# Patient Record
Sex: MALE | Race: WHITE | NOT HISPANIC OR LATINO | URBAN - METROPOLITAN AREA
[De-identification: names, ages, dates, MRNs, and addresses within clinical notes are randomized per-mention and may not be internally consistent; named-entity substitution may affect disease eponyms.]

---

## 2017-01-01 ENCOUNTER — COMMUNICATION - HEALTHEAST (OUTPATIENT)
Dept: ONCOLOGY | Facility: HOSPITAL | Age: 77
End: 2017-01-01

## 2017-01-01 ENCOUNTER — HOME CARE/HOSPICE - HEALTHEAST (OUTPATIENT)
Dept: HOME HEALTH SERVICES | Facility: HOME HEALTH | Age: 77
End: 2017-01-01

## 2017-01-01 ENCOUNTER — COMMUNICATION - HEALTHEAST (OUTPATIENT)
Dept: SCHEDULING | Facility: CLINIC | Age: 77
End: 2017-01-01

## 2017-01-01 ENCOUNTER — HOME CARE/HOSPICE - HEALTHEAST (OUTPATIENT)
Dept: HOSPICE | Facility: HOSPICE | Age: 77
End: 2017-01-01

## 2017-01-01 ENCOUNTER — AMBULATORY - HEALTHEAST (OUTPATIENT)
Dept: INFUSION THERAPY | Facility: HOSPITAL | Age: 77
End: 2017-01-01

## 2017-01-01 ENCOUNTER — COMMUNICATION - HEALTHEAST (OUTPATIENT)
Dept: OCCUPATIONAL THERAPY | Facility: CLINIC | Age: 77
End: 2017-01-01

## 2017-01-01 ENCOUNTER — AMBULATORY - HEALTHEAST (OUTPATIENT)
Dept: ONCOLOGY | Facility: HOSPITAL | Age: 77
End: 2017-01-01

## 2017-01-01 ENCOUNTER — COMMUNICATION - HEALTHEAST (OUTPATIENT)
Dept: FAMILY MEDICINE | Facility: CLINIC | Age: 77
End: 2017-01-01

## 2017-01-01 ENCOUNTER — RECORDS - HEALTHEAST (OUTPATIENT)
Dept: ADMINISTRATIVE | Facility: OTHER | Age: 77
End: 2017-01-01

## 2017-01-01 ENCOUNTER — HOSPITAL ENCOUNTER (OUTPATIENT)
Dept: ULTRASOUND IMAGING | Facility: HOSPITAL | Age: 77
Discharge: HOME | End: 2017-06-13

## 2017-01-01 ENCOUNTER — COMMUNICATION - HEALTHEAST (OUTPATIENT)
Dept: ONCOLOGY | Facility: CLINIC | Age: 77
End: 2017-01-01

## 2017-01-01 ENCOUNTER — AMBULATORY - HEALTHEAST (OUTPATIENT)
Dept: RADIATION ONCOLOGY | Age: 77
End: 2017-01-01

## 2017-01-01 ENCOUNTER — OFFICE VISIT - HEALTHEAST (OUTPATIENT)
Dept: RADIATION ONCOLOGY | Facility: HOSPITAL | Age: 77
End: 2017-01-01

## 2017-01-01 ENCOUNTER — COMMUNICATION - HEALTHEAST (OUTPATIENT)
Dept: HOME HEALTH SERVICES | Facility: HOME HEALTH | Age: 77
End: 2017-01-01

## 2017-01-01 ENCOUNTER — HOSPITAL ENCOUNTER (OUTPATIENT)
Dept: CT IMAGING | Facility: HOSPITAL | Age: 77
Setting detail: RADIATION/ONCOLOGY SERIES
Discharge: STILL ADMITTED OR ADMINISTRATIVE | End: 2017-05-10
Attending: INTERNAL MEDICINE

## 2017-01-01 ENCOUNTER — HOSPITAL ENCOUNTER (OUTPATIENT)
Dept: MRI IMAGING | Facility: HOSPITAL | Age: 77
Discharge: HOME | End: 2017-03-13
Attending: RADIOLOGY

## 2017-01-01 ENCOUNTER — COMMUNICATION - HEALTHEAST (OUTPATIENT)
Dept: ADMINISTRATIVE | Facility: HOSPITAL | Age: 77
End: 2017-01-01

## 2017-01-01 ENCOUNTER — HOSPITAL ENCOUNTER (OUTPATIENT)
Dept: ULTRASOUND IMAGING | Facility: HOSPITAL | Age: 77
Discharge: HOME | End: 2017-01-17
Attending: INTERNAL MEDICINE

## 2017-01-01 ENCOUNTER — OFFICE VISIT - HEALTHEAST (OUTPATIENT)
Dept: RADIATION ONCOLOGY | Facility: CLINIC | Age: 77
End: 2017-01-01

## 2017-01-01 ENCOUNTER — AMBULATORY - HEALTHEAST (OUTPATIENT)
Dept: HOME HEALTH SERVICES | Facility: HOME HEALTH | Age: 77
End: 2017-01-01

## 2017-01-01 ENCOUNTER — HOSPITAL ENCOUNTER (OUTPATIENT)
Dept: MRI IMAGING | Facility: HOSPITAL | Age: 77
Setting detail: RADIATION/ONCOLOGY SERIES
Discharge: STILL ADMITTED OR ADMINISTRATIVE | End: 2017-02-06
Attending: RADIOLOGY

## 2017-01-01 ENCOUNTER — COMMUNICATION - HEALTHEAST (OUTPATIENT)
Dept: RADIATION ONCOLOGY | Facility: HOSPITAL | Age: 77
End: 2017-01-01

## 2017-01-01 ENCOUNTER — OFFICE VISIT - HEALTHEAST (OUTPATIENT)
Dept: ONCOLOGY | Facility: HOSPITAL | Age: 77
End: 2017-01-01

## 2017-01-01 ENCOUNTER — AMBULATORY - HEALTHEAST (OUTPATIENT)
Dept: HOSPICE | Facility: HOSPICE | Age: 77
End: 2017-01-01

## 2017-01-01 ENCOUNTER — HOSPITAL ENCOUNTER (OUTPATIENT)
Dept: MRI IMAGING | Facility: HOSPITAL | Age: 77
Setting detail: RADIATION/ONCOLOGY SERIES
Discharge: STILL ADMITTED OR ADMINISTRATIVE | End: 2017-06-13
Attending: RADIOLOGY

## 2017-01-01 ENCOUNTER — COMMUNICATION - HEALTHEAST (OUTPATIENT)
Dept: RADIATION ONCOLOGY | Facility: CLINIC | Age: 77
End: 2017-01-01

## 2017-01-01 ENCOUNTER — HOSPITAL ENCOUNTER (OUTPATIENT)
Dept: CT IMAGING | Facility: HOSPITAL | Age: 77
Discharge: HOME | End: 2017-01-04
Attending: INTERNAL MEDICINE

## 2017-01-01 ENCOUNTER — AMBULATORY - HEALTHEAST (OUTPATIENT)
Dept: ONCOLOGY | Facility: CLINIC | Age: 77
End: 2017-01-01

## 2017-01-01 ENCOUNTER — AMBULATORY - HEALTHEAST (OUTPATIENT)
Dept: RADIATION ONCOLOGY | Facility: HOSPITAL | Age: 77
End: 2017-01-01

## 2017-01-01 DIAGNOSIS — C34.90 SMALL CELL LUNG CANCER, UNSPECIFIED LATERALITY (H): ICD-10-CM

## 2017-01-01 DIAGNOSIS — Z79.01 ENCOUNTER FOR MONITORING COUMADIN THERAPY: ICD-10-CM

## 2017-01-01 DIAGNOSIS — Z86.718 PERSONAL HISTORY OF OTHER VENOUS THROMBOSIS AND EMBOLISM: ICD-10-CM

## 2017-01-01 DIAGNOSIS — C79.31 BRAIN METASTASIS: ICD-10-CM

## 2017-01-01 DIAGNOSIS — M79.606 LEG PAIN: ICD-10-CM

## 2017-01-01 DIAGNOSIS — C34.90 LUNG CANCER (H): ICD-10-CM

## 2017-01-01 DIAGNOSIS — I82.4Y1: ICD-10-CM

## 2017-01-01 DIAGNOSIS — I82.409 DVT (DEEP VENOUS THROMBOSIS) (H): ICD-10-CM

## 2017-01-01 DIAGNOSIS — Z51.81 ENCOUNTER FOR MONITORING COUMADIN THERAPY: ICD-10-CM

## 2017-01-01 DIAGNOSIS — I82.491 DEEP VEIN THROMBOSIS (DVT) OF OTHER VEIN OF RIGHT LOWER EXTREMITY, UNSPECIFIED CHRONICITY (H): ICD-10-CM

## 2017-01-01 DIAGNOSIS — Z51.5 PALLIATIVE CARE ENCOUNTER: ICD-10-CM

## 2017-01-01 DIAGNOSIS — R60.0 LOCALIZED EDEMA: ICD-10-CM

## 2017-01-01 DIAGNOSIS — C34.92 SMALL CELL LUNG CANCER, LEFT (H): ICD-10-CM

## 2017-01-01 DIAGNOSIS — R53.1 GENERALIZED WEAKNESS: ICD-10-CM

## 2017-01-01 DIAGNOSIS — I67.89 RADIATION THERAPY INDUCED BRAIN NECROSIS: ICD-10-CM

## 2017-01-01 DIAGNOSIS — C79.31 BRAIN METASTASES: ICD-10-CM
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
## 2017-01-01 ASSESSMENT — MIFFLIN-ST. JEOR
SCORE: 1696.02
SCORE: 1696.93
SCORE: 1703.73
SCORE: 1700.56

## 2017-10-03 ENCOUNTER — COMMUNICATION - HEALTHEAST (OUTPATIENT)
Dept: FAMILY MEDICINE | Facility: CLINIC | Age: 77
End: 2017-10-03

## 2017-10-03 ENCOUNTER — HOME CARE/HOSPICE - HEALTHEAST (OUTPATIENT)
Dept: HOSPICE | Facility: HOSPICE | Age: 77
End: 2017-10-03

## 2017-10-12 ENCOUNTER — HOME CARE/HOSPICE - HEALTHEAST (OUTPATIENT)
Dept: HOSPICE | Facility: HOSPICE | Age: 77
End: 2017-10-12

## 2021-05-26 ENCOUNTER — RECORDS - HEALTHEAST (OUTPATIENT)
Dept: ADMINISTRATIVE | Facility: CLINIC | Age: 81
End: 2021-05-26

## 2021-05-29 ENCOUNTER — RECORDS - HEALTHEAST (OUTPATIENT)
Dept: ADMINISTRATIVE | Facility: CLINIC | Age: 81
End: 2021-05-29

## 2021-05-30 ENCOUNTER — RECORDS - HEALTHEAST (OUTPATIENT)
Dept: ADMINISTRATIVE | Facility: CLINIC | Age: 81
End: 2021-05-30

## 2021-05-30 VITALS — WEIGHT: 212.7 LBS | BODY MASS INDEX: 30.52 KG/M2

## 2021-05-30 VITALS — WEIGHT: 216.2 LBS | HEIGHT: 70 IN | BODY MASS INDEX: 30.95 KG/M2

## 2021-05-30 VITALS — BODY MASS INDEX: 31.57 KG/M2 | WEIGHT: 220 LBS

## 2021-05-30 VITALS — WEIGHT: 214.2 LBS | HEIGHT: 71 IN | BODY MASS INDEX: 29.99 KG/M2

## 2021-05-31 VITALS — BODY MASS INDEX: 31.07 KG/M2 | WEIGHT: 217 LBS | HEIGHT: 70 IN

## 2021-05-31 VITALS — BODY MASS INDEX: 30.71 KG/M2 | WEIGHT: 214 LBS

## 2021-05-31 VITALS — BODY MASS INDEX: 30.92 KG/M2 | WEIGHT: 216 LBS | HEIGHT: 70 IN

## 2021-06-01 ENCOUNTER — RECORDS - HEALTHEAST (OUTPATIENT)
Dept: ADMINISTRATIVE | Facility: CLINIC | Age: 81
End: 2021-06-01

## 2021-06-08 NOTE — PROGRESS NOTES
Patient seen in clinic today. INR reviewed by Dr. Whelan. Patient is to alternate between 5 mg and 2.5 mg. He will have home care recheck his INR in a week.

## 2021-06-08 NOTE — PROGRESS NOTES
Patient arrived ambulatory, by self, for port lab draw for INR.  Port accessed under aseptic technique - excellent blood return noted.  Blood drawn for labs.  Port flushed with NS.  Heparin instilled.  Needle dced.

## 2021-06-08 NOTE — PROGRESS NOTES
Patient here ambulatory with wheeled walker accompanied by family for follow up s/p radiation for brain mets.  Patient states he is feeling much stronger and more steady on his feet.  Denies any headaches or pain.  MRI done yesterday and is here today for results.  Seen by Dr. Bran.  Plan RTC for follow up as directed by physician.

## 2021-06-08 NOTE — PROGRESS NOTES
INR per [facility] [phone] today is 3.3. Patient has been doing 5 mg daily. INR result reviewed by . He indicates that patient is to hold dose tonight and alternate between 5 & 2.5 mg. Ariel is to have his INR rechecked on 1/05 in clinic.      I made BALJIT Brody aware so she can report back to Ariel.

## 2021-06-08 NOTE — PROGRESS NOTES
Patient arrived ambulatory, accompanied by wife, for port lab draw.  Port accessed under aseptic technique - excellent blood return noted.  Blood drawn for INR.  Port flushed with NS.  Heparin instilled and needle dced.  Dressing applied.  Patient left ambulatory, with wife.  Instructed to call with questions/concerns/problems.

## 2021-06-08 NOTE — PROGRESS NOTES
INR reviewed with JOSE Fields. Patient is stable. No change to his coumadin dosage. I reported this off to Pat, patient's spouse. He is to return for an INR check in about a month.

## 2021-06-08 NOTE — PROGRESS NOTES
"Received pt following a CT scan, and has requested for labs to be drawn. Port is currently accessed, with an excellent blood return. Labs were drawn - a copy of the results was explained to Ariel/Corie; and notified triage of the labs. The INR is 2.51, and platelets 164. \"what about the bleeding in the catheter.\" Mares catheter is draining nat urine with white sediment and some old blood - no jayashree bleeding noted. VSS. Port was flushed, heparinized, and then deaccessed. Pt left unit ambulatory at 10:15, accompanied by his spouse (Corie) - and plans to return tomorrow for the results of today's CT.     Included int today's labs was a keppra level - however, patient took a dose of keppra approximately 1 hour before the labs were drawn today.    Nunu Osei RN  "

## 2021-06-08 NOTE — PROGRESS NOTES
Walked pt and spouse up to outpt chemo for lab draw. Port left accessed and heparin flush given to infusion RN

## 2021-06-09 NOTE — PROGRESS NOTES
Met with patient, his wife, and his daughter today in clinic after their appointment with Dr. Bran.  He reports that he is fatigued, but otherwise is doing well.  He has been on steroids, and this has been giving him side effects as well.  They got good news with his MRI report today, so were feeling relieved.  He denies the need for any additional resources at this time.  I will continue to follow up and be a resource to them going forward.  Verenice John RN

## 2021-06-09 NOTE — PROGRESS NOTES
Patient arrived ambulatory, for port lab draw.  Port accessed under aseptic technique - excellent blood return noted.  Blood drawn for INR.  Port flushed with NS.  Heparin instilled and needle dced.  Dressing applied.  Patient's wife states that right foot and ankle are swollen.  Ariel states that it does not hurt.  Only slightly larger than left ankle.  Suggestion was made that he keep his leg elevated as much as possible and wear a support stocking on the leg to help decrease swelling.  To call with questions/concerns/problems.  Patient verbalized understanding.

## 2021-06-09 NOTE — PROGRESS NOTES
Patient here ambulatory with wheeled walker accompanied family for follow up s/p radiation for brain mets.  Patient states he is doing well.  Occasionally he will have some blurriness in his vision, though states he night vision seems to be better.  Denies pain or headaches.  MRI done 3/13/17 and is here today for results.  Seen by Dr. Bran.  Plan RTC for follow up as directed by physician.

## 2021-06-10 NOTE — PROGRESS NOTES
[facility] Hematology and Oncology Progress Note    Patient: Ariel Louie  MRN: [id number]  Date of Service:         Reason for Visit    Chief Complaint   Patient presents with     HE Cancer     Small cell lung cancer - follow up       Assessment and Plan    Small cell lung cancer, now in complete remission  Right lower extremity DVT, diagnosed October 2015  Second occurrence of brain metastasis status post CyberKnife radiation  Urinary retention requiring indwelling catheter    CT scans reviewed and there is no recurrence!  Will see patient again in 3-4 months with repeat CT scans.  Will recheck INR in 1 week.    He will continue physical therapy.    PLAN:  As above      Measurable disease: MRI of the brain, CT of the chest abdomen and pelvis, patient in complete remission          Current therapy: Coumadin          Treatment history:  Second course of CyberKnife radiation to 2700 cGy in 3 fractions between December 2 and December 6  CyberKnife radiation 2200 cGy in 1 treatment to new left frontal solitary metastasis Joselin 15, 2016  Patient completed concurrent radiation with 3 cycles of cisplatin etoposide in the first week of October 2015, for second relapse  4 cycles of Topotecan for first relapse comleted 5/15 with near complete response   PCI 8/14   Carboplatin and etoposide for 6 cycles 1-5/2014 after initial diagnosis         ECOG Performance   ECOG Performance Status: 2    Distress Assessment  Distress Assessment Score: No distress    Pain           Problem List    1. Small cell lung cancer, unspecified laterality  CT Chest Abdomen Pelvis Without Oral With IV Contrast   2. DVT (deep venous thrombosis)  INR        CC: Luis Enrique Zuniga MD    ______________________________________________________________________________    History of Present Illness    Mr. Ariel Louie is here for follow-up.  Was hospitalized twice in March with urinary issues and CNS issues.  Doing ok but still fatigued.  Still requiring  urinary catheter.  No recent infection.  No headaches, shortness of breath or cough.  ECOG 2.    Pain Status  Currently in Pain: No/denies    Review of Systems    Constitutional  Constitutional (WDL): Exceptions to WDL  Fatigue: Fatigue not relieved by rest - Limiting instrumental ADL  Weight Gain: 5 - <10% from baseline (Up 8 lbs since last office visit in January.)  Neurosensory  Neurosensory (WDL): Exceptions to WDL  Ataxia: Moderate symptoms, limiting instrumental ADL (In a wheelchair for today's appointment. Uses a walker at home. Has PT come to the home.)  Cardiovascular  Cardiovascular (WDL): Exceptions to WDL  Edema: Yes (Feet swelling, intermittently. Wears compression stockings.)  Pulmonary  Respiratory (WDL): Within Defined Limits  Gastrointestinal  Gastrointestinal (WDL): Exceptions to WDL  Constipation: Occasional or intermittent symptoms, occasional use of stool softeners, laxatives, dietary modification, or enema (Uses a stool softener.)  Genitourinary  Genitourinary (WDL): Exceptions to WDL (Indwelling catheter. Good urinary output.)  Integumentary  Integumentary (WDL): All integumentary elements are within defined limits  Patient Coping  Patient Coping: Accepting  Distress Assessment  Distress Assessment Score: No distress  Accompanied by  Accompanied by: Family Member    Past History  Past Medical History:   Diagnosis Date     Anxiety      Back pain      Bladder cancer      Brain metastasis      DVT (deep venous thrombosis), right      Forgetfulness      GERD (gastroesophageal reflux disease)      History of cancer chemotherapy      Hypertension      Leukocytosis 12/10/2016     Lung cancer      Paroxysmal atrial fibrillation      UTI (urinary tract infection) 12/10/2016         Past Surgical History:   Procedure Laterality Date     BACK SURGERY      x 4     bladder surgery       KNEE SURGERY       PORTACATH PLACEMENT         Physical Exam    Recent Vitals 5/11/2017   Weight 220 lbs   /76    Pulse 66   Temp 97.7   Temp src 1   SpO2 94       GENERAL: Alert and oriented. Seated comfortably. In no distress.    HEAD: Atraumatic and normocephalic.  Has a full head of hair.    EYES: LUBA, EOMI.  No pallor.  No icterus.    Oral cavity: no mucosal lesion or tonsillar enlargement.    NECK: supple. JVP normal.  No thyroid enlargement.    LYMPH NODES: No palpable, cervical, axillary or inguinal lymphadenopathy.    CHEST: clear to auscultation bilaterally.  Resonant to percussion throughout bilaterally.  Symmetrical breath movements bilaterally.    CVS: S1 and S2 are heard. Regular rate and rhythm.  No murmur or gallop or rub heard.    ABDOMEN: Soft. Not tender. Not distended.  No palpable hepatomegaly or splenomegaly.  No other mass palpable.  Bowel sounds heard.    EXTREMITIES: Warm.  No peripheral edema.    SKIN: no rash, or bruising or purpura.    CNS: Nonfocal        Lab Results    Recent Results (from the past 168 hour(s))   INR   Result Value Ref Range    INR 1.79 (H) 0.90 - 1.10   Comprehensive Metabolic Panel   Result Value Ref Range    Sodium 142 136 - 145 mmol/L    Potassium 4.0 3.5 - 5.0 mmol/L    Chloride 106 98 - 107 mmol/L    CO2 28 22 - 31 mmol/L    Anion Gap, Calculation 8 5 - 18 mmol/L    Glucose 117 70 - 125 mg/dL    BUN 16 8 - 28 mg/dL    Creatinine 1.08 0.70 - 1.30 mg/dL    GFR MDRD Af Amer >60 >60 mL/min/1.73m2    GFR MDRD Non Af Amer >60 >60 mL/min/1.73m2    Bilirubin, Total 0.5 0.0 - 1.0 mg/dL    Calcium 9.5 8.5 - 10.5 mg/dL    Protein, Total 6.7 6.0 - 8.0 g/dL    Albumin 3.2 (L) 3.5 - 5.0 g/dL    Alkaline Phosphatase 58 45 - 120 U/L    AST 17 0 - 40 U/L    ALT 12 0 - 45 U/L   HM1 (CBC with Diff)   Result Value Ref Range    WBC 5.2 4.0 - 11.0 thou/uL    RBC 3.95 (L) 4.40 - 6.20 mill/uL    Hemoglobin 12.1 (L) 14.0 - 18.0 g/dL    Hematocrit 37.1 (L) 40.0 - 54.0 %    MCV 94 80 - 100 fL    MCH 30.6 27.0 - 34.0 pg    MCHC 32.6 32.0 - 36.0 g/dL    RDW 13.9 11.0 - 14.5 %    Platelets 279 140 -  440 thou/uL    MPV 10.2 8.5 - 12.5 fL    Neutrophils % 65 50 - 70 %    Lymphocytes % 18 (L) 20 - 40 %    Monocytes % 12 (H) 2 - 10 %    Eosinophils % 4 0 - 6 %    Basophils % 1 0 - 2 %    Neutrophils Absolute 3.4 2.0 - 7.7 thou/uL    Lymphocytes Absolute 0.9 0.8 - 4.4 thou/uL    Monocytes Absolute 0.6 0.0 - 0.9 thou/uL    Eosinophils Absolute 0.2 0.0 - 0.4 thou/uL    Basophils Absolute 0.1 0.0 - 0.2 thou/uL   POCT GFR   Result Value Ref Range    POC GFR AMER AF HE >60  >60 mL/min/1.73m2    POC GFR NON AMER AF >60  >60 mL/min/1.73m2   POCT creatinine   Result Value Ref Range    POC Creatinine 1.1 mg/dL       Imaging    Ct Chest Abdomen Pelvis Without Oral With Iv Contrast    Result Date: 5/10/2017  CT CHEST, ABDOMEN, AND PELVIS 5/10/2017 1:20 PM      INDICATION: Lung cancer with brain metastases treated without evidence of recurrent disease. Initially diagnosed in 2013. TECHNIQUE: CT chest, abdomen, and pelvis. Dose reduction techniques were used. IV CONTRAST: Iohexol (Omni) 100 mL COMPARISON: 1/4/2017 FINDINGS: CHEST: Scarring in the left upper lobe and about the left and thickening along the inferior left fissure is stable. No evidence of recurrence. No adenopathy or pleural effusions. Dense coronary artery calcifications  ABDOMEN: There is diffuse infiltration of the spleen splenule, fatty replaced pancreas and gallbladder are normal. Less than 1 cm, bilateral adrenal nodules, 2 on the right and one on the left are stable. There is diffuse renal cortical thinning bilaterally. There is a punctate intrarenal calculus in the and less than 1 cm cyst in the lower pole of left kidney. Aorta is normal caliber with atherosclerotic calcification. No adenopathy. PELVIS: Fully catheter in place again prostate is not seen. MUSCULOSKELETAL: No suspicious lesions.     CONCLUSION: 1.  Stable post treatment changes in the left lung without evidence of recurrence in the chest, abdomen or pelvis. 2.  Patient has significant  coronary artery calcifications, fatty infiltration, bilateral, presumed nonfunctioning adrenal adenomas, nonobstructing punctate intrarenal calculus in the left kidney and a tiny left renal cyst.        Signed by: Lanny Whelan MD

## 2021-06-10 NOTE — PROGRESS NOTES
INR reviewed with JOSE Fields. Patient is to increase his coumadin: 5 mg on Sat, Sun, Mon. 2.5 mg all other days.     I reported this off to Pat, patient's spouse. She verbalized understanding and appreciation of my call.     INR is to be rechecked in 1 week, per Dr. Whelan.     Home Care was informed that out of patient convenience, INR to be checked at home. I spoke with Martine from home care to coordinate this.

## 2021-06-10 NOTE — PROGRESS NOTES
INR checked by home care. They report it being 2.0. Patient is to continue same coumadin regimen. Recheck of INR in two weeks.

## 2021-06-11 NOTE — PROGRESS NOTES
LIZBETH Cardoza [facility] RN calls clinic to report Ariel's INR. Result reviewed with Dr. Whelan. He indicates that Ariel is to continue same dosage of coumadin and get INR rechecked in two weeks.     Ariel will come to clinic on 6/14 to have his port flushed and INR checked.

## 2021-06-11 NOTE — PROGRESS NOTES
I met with Ariel, his wife, Corie and dtr Grace briefly today in clinic.  Corie feels Ariel is depressed.  He is at a TCU and doing better she thinks.  States he wants malts all the time and the dtr said to not give him too many as he will gain a lot of weight.  I said for now he should eat what he wants.  Corie is worried about the results of the scans he had done at the hospital.  I encouraged them to call if they need anything.  They appreciated the check in.

## 2021-06-11 NOTE — PROGRESS NOTES
Patient's INR is stable. No change in his coumadin dosage. He is to have his INR rechecked on or around 7/12. I reported this to Ariel's spouse, Corie. She verbalized understanding and appreciation.     Ariel will no longer have INRs checked by us. [facility].

## 2021-06-11 NOTE — PROGRESS NOTES
[facility] Radiation Oncology Follow Up Note    Patient: Ariel Louie  MRN: [id number]  Date of Service: 07/14/2017    Assessment / Impression     1. Brain metastases  dexamethasone (DECADRON) 4 MG tablet    famotidine (PEPCID) 20 MG tablet      No matching staging information was found for the patient.  ECOG Peformance Status  ECOG Performance Status: 3  Distress Assessment Score  Distress Assessment Score: No distress  Body site: Brain    Plan:   Steroid taper given today.  Needs Pepcid.  F/u 2 months with MRI brain may f/u post scan with Dr. Whelan      Face to face time  25 minutes with > 75% spent on consultation, education and coordination of care.    Subjective:      HPI: Ariel Louie is a 77 y.o. male here for post discharge check     HISTORY: Ariel Louie was treated with SRS radiation therapy to the left frontal lobe for a brain met from extensive small cell lung cancer.      SITE TREATED: left frontal brain  TOTAL DOSE: 2200cGy  NUMBER OF FRACTIONS: 1  DATES COMPLETED: 5/2016     SITE TREATED: left frontal brain  TOTAL DOSE: 2700cGy  NUMBER OF FRACTIONS: 3  DATES COMPLETED: 12/2016        He is now admitted for speech disturbances and weakness.  CT shows vasogenic edema of left frontal lobe.  Urosepsis.      The edema and sx are likely related to radiation treatment change/radiation necrosis.  Initial treatment would be steroids. MRI as outpatient sufficient unless his sx get worse. No further radiation would be administered.     Steroid taper given today.  Needs Pepcid.   Chief Complaint   Patient presents with     HE Cancer     Lung Cancer     brain mets   .    Current Outpatient Prescriptions   Medication Sig Dispense Refill     acetaminophen (TYLENOL) 500 MG tablet Take 1,000 mg by mouth every 6 (six) hours as needed for pain.        citalopram (CELEXA) 20 MG tablet Take 20 mg by mouth daily.        dexamethasone (DECADRON) 4 MG tablet Take 1 tab (4mg TID) for 3 days, then 1 tab BID for 7  days, then 1 tab qam for 7 days, then 1/2 tab qam for 7 days. 50 tablet 2     lacosamide (VIMPAT) 50 mg Tab Take 1 tablet (50 mg total) by mouth at bedtime. 30 tablet 0     levETIRAcetam (KEPPRA) 750 MG tablet Take 1 tablet (750 mg total) by mouth 2 (two) times a day. (Patient taking differently: Take 750 mg by mouth 2 (two) times a day. ) 60 tablet 0     levoFLOXacin (LEVAQUIN) 500 MG tablet Take 1 tablet (500 mg total) by mouth Daily at 6:00 am for 7 days.  0     multivitamin therapeutic (THERAGRAN) tablet Take 1 tablet by mouth daily.        senna (SENOKOT) 8.6 mg tablet Take 3 tablets by mouth 2 (two) times a day.        warfarin (COUMADIN) 2.5 MG tablet Take 1mg on 7/8/17.  INR recommended to be drawn 7/9/17 with dose adjusted by MD---on levaquin and dexamethasone which can both affect INR.  0     famotidine (PEPCID) 20 MG tablet Take 1 tablet (20 mg total) by mouth 2 (two) times a day. 60 tablet 3     No current facility-administered medications for this visit.      Facility-Administered Medications Ordered in Other Visits   Medication Dose Route Frequency Provider Last Rate Last Dose     heparin 100 unit/mL lockflush (PF) porcine 300-600 Units  300-600 Units Intravenous PRN Lanny Whelan MD   600 Units at 09/28/15 0911       The following portions of the patient's history were reviewed and updated as appropriate: allergies, current medications, past family history, past medical history, past social history, past surgical history and problem list.    Review of Systems    Constitutional  Constitutional (WDL): Exceptions to WDL  Fatigue: Fatigue not relieved by rest - Limiting instrumental ADL  Neurosensory  Neurosensory (WDL): Exceptions to WDL  Peripheral Motor Neuropathy: Severe symptoms, limiting self care ADL, assistive device indicated (w/c)  Ataxia: Moderate symptoms, limiting instrumental ADL  Confusion: Moderate disorientation, limiting instrumental ADL  Eye        Eye Disorder (WDL):  All eye disorder elements are within defined limits  Ear     Cardiovascular  Cardiovascular (WDL): Exceptions to WDL  Edema: Yes (mild pedal edema)  Pulmonary  Respiratory (WDL): Exceptions to WDL (voice is at a whisper)  Gastrointestinal  Dysphagia: Symptomatic and altered eating/swallowing (voice is at a whisper, c/o heartburn, belching)  Genitourinary  Genitourinary (WDL): Exceptions to WDL (indwelling cath, urine clear yellow)              Musculoskeletal              Musculoskeletal and Connetive Tissue Disorders (WDL): Exceptions to WDL  Muscle Weakness : Symptomatic, evident on physical exam, limiting instrumental ADL  Myalgia: Mild pain  Integumentary  Integumentary (WDL): All integumentary elements are within defined limits  Patient Coping  Patient Coping: Accepting  Pain              Currently in Pain: No/denies  Accompanied by  Accompanied by: Family Member (spouse and daughter)    Objective:     Physical Exam    Vitals:    07/14/17 1512   BP: 132/75   Pulse: 68   SpO2: 96%   Weight: 214 lb (97.1 kg)             Recent Labs:   Recent Results (from the past 168 hour(s))   Protime-INR   Result Value Ref Range    INR 2.72 (H) 0.90 - 1.10       Imaging: Imaging results 6 weeks:Cta Head And Neck    Result Date: 7/5/2017  HEAD AND NECK CT ANGIOGRAM WITH IV CONTRAST 7/5/2017 10:19 PM INDICATION: Dysarthria TECHNIQUE: Head and neck CT angiogram with IV contrast. Noncontrast head CT followed by axial helical CT images of the head and neck vessels obtained during the arterial phase of intravenous contrast administration. Axial helical 2D reconstructed images and multiplanar 3D MIP reconstructed images of the head and neck vessels were performed by the technologist. Dose reduction techniques were used. CONTRAST: 100 mL of Omnipaque 350 COMPARISON: 03/26/2017 FINDINGS: NONCONTRAST HEAD CT: No hemorrhage. Mild atrophy. Mild to moderate presumed chronic small vessel ischemia. Moderately sized vasogenic edema left  frontal lobe, mildly to moderately increased since the prior CT exam. This is associated with a patient's known metastatic lesions which showed enlargement on the most recent MRI of 06/13/2017. No evidence for Global mass effect. Normal orbits. Clear paranasal sinuses and mastoid air cells. HEAD CTA: No definite aneurysm or AVM. No hemodynamically significant narrowing of a major vessel. Symmetric distal branches. Patent major dural venous sinuses. NECK CTA: Normal subclavian arteries. Mild to moderate narrowing proximal aspect of nondominant right vertebral artery. Both vertebral arteries are otherwise normal. Mild atherosclerotic changes without hemodynamically significant narrowing in either ICA, utilizing the NASCET criteria.     CONCLUSION: HEAD CTA: 1.  No hemorrhage. 2.  Mild atrophy. 3.  Mild to moderate presumed chronic small vessel ischemia. 4.  Moderately sized vasogenic edema left frontal lobe, mildly to moderately increased since the prior CT exam. This is associated with a patient's known metastatic lesions which showed enlargement on the most recent MRI of 06/13/2017. No evidence for Global mass effect. 5.  Normal intracranial circulation. NECK CTA: No hemodynamically significant narrowing throughout major neck vessels.     Ct Chest Abdomen Pelvis With Oral With Iv Cont    Result Date: 7/7/2017  CT CHEST, ABDOMEN, AND PELVIS 7/6/2017, 11:35 PM      INDICATION: Lung cancer, non-small-cell, staging. TECHNIQUE: CT chest, abdomen, and pelvis. Dose reduction techniques were used. IV CONTRAST: 100 mL iohexol (Omni). COMPARISON: 5/10/2017 FINDINGS: CHEST: Stable scarring and atelectasis in the left upper lobe. No pneumothorax or pleural effusion. No hilar or mediastinal lymphadenopathy. Mildly enlarged cardiac silhouette. No pericardial effusion. Patent atherosclerotic aorta. Patent pulmonary arteries. Right-sided port with tip terminating at right cavoatrial junction.  ABDOMEN: Right 1.5 x 1.3-cm adrenal  nodule. Left 1.8 x 1.6-cm adrenal nodule, unchanged. 2-cm hypodensity in segment 4 of the liver, unchanged. Normal spleen. Normal pancreas. No abdominal or retroperitoneal lymphadenopathy. Patent atherosclerotic aorta. Normal kidneys. No hydroureteronephrosis. No obstructing renal or ureteral stones. PELVIS: Normal bowel. No obstruction, colitis, or diverticulitis. No free pelvic fluid or lymphadenopathy. Mares catheter with inflated balloon within bladder. MUSCULOSKELETAL: Negative.     CONCLUSION: 1.  Posttreatment changes of the left upper lobe and hilum. No evidence of recurrent mass lesion or lymphadenopathy. 2.  Stable bilateral adrenal nodules. 3.  Unchanged 2-cm focal left hepatic hypodensity, unchanged, reportedly PET negative on prior studies. Recommend close scrutiny on follow-up exams. 4.  Atherosclerotic vascular disease and coronary artery disease.    Mr Head With Without Contrast    Result Date: 6/14/2017  [facility] HEAD MRI WITHOUT AND WITH IV CONTRAST 6/13/2017 1:00 PM INDICATION: Follow-up brain metastasis from small cell lung cancer TECHNIQUE: Head MRI without and with intravenous contrast. CONTRAST: 10 cc gadavist COMPARISON:  03/26/2017 FINDINGS: No restricted diffusion suggestive of acute infarct.  Unchanged moderate diffuse parenchymal volume loss with ex vacuo ventricular dilatation. Unchanged periventricular FLAIR signal abnormality which may relate to sequela of chronic microvascular ischemic change as well as posttreatment changes.     The cerebellum is unremarkable. When compared to 03/13/2017, there has been interval increase in size of the left frontal lobe perilesional FLAIR signal hyperintensity. The area of enhancement has also increased, currently measuring 1.8 x 2.1 cm (image 20, axial postcontrast series 13), previously measuring 1.5 x 1.6 cm. There is mild increased relative cerebral blood volume along the periphery of the lesion. The pituitary gland is unremarkable.  The major intracranial vascular flow voids are maintained. The orbits are unremarkable. The calvarium and skull base are unremarkable.  The paranasal sinuses are clear. Partial fluid opacification of the mastoid air cells.        CONCLUSION: 1.  When compared to the 03/13/2017 study, there is been interval increase in size of the left frontal lobe enhancing lesion, currently measuring 1.8 x 2.1 cm, previously 1.5 x 1.6 cm. The surrounding perilesional FLAIR signal hyperintensity has also increased. There is relative cerebral blood volume signal along the margin of the enhancing lesion which is not above background parenchyma, however, is new/increased compared to the prior MRI study. Attention should be paid to this area on followup exam.     Us Venous Leg Right    Result Date: 6/13/2017  US VENOUS LEG RIGHT 6/13/2017 1:37 PM INDICATION: leg pain, hx of lung cancer TECHNIQUE: Routine exam without and with compression, augmentation, and duplex utilizing 2D gray-scale imaging, Doppler interrogation with color-flow and spectral waveform analysis. COMPARISON: 1/17/2017 FINDINGS: The common femoral, femoral, popliteal, and segmentally visualized calf veins were evaluated. The opposite CFV was also included in the evaluation. Right leg veins are negative for acute deep venous thrombosis. There is chronic occlusive thrombus in the right and left proximal and mid superficial femoral veins extending to the distal common femoral veins. There is a small chronic-appearing thrombus in the distal right popliteal vein. No popliteal cysts.     CONCLUSION: 1.  Right leg veins are positive for chronic appearing DVT as detailed above. No acute DVT identified. 2.  Findings will be relayed to the ordering collection.      Signed by: Aleksandra Benton MD

## 2021-06-11 NOTE — PROGRESS NOTES
Ariel comes in to clinic for an MRI. His spouse, Corie reports that he is having Rt leg pain. No redness, swelling or warmth reported. I reviewed this with JOSE Fields who agreed to getting an ultrasound done after MRI.     Radiologist calls CNP to report that there is no acute blood clot present. I reported this to Ariel and Corie. They verbalized understanding and appreciation.

## 2021-06-11 NOTE — PROGRESS NOTES
I met with Ariel, his wife, Corie and daughter, Grace in Ariel's inpatient room at [facility] today.  I have agreed to take over as their Cancer Care Nurse Navigator as their previous one has left [facility].  They appreciated the check in and a chance to meet in person.  I will continue to follow Ariel and his family as needed.  Many questions answered.  They also now have my contact information as well.

## 2021-06-11 NOTE — PROGRESS NOTES
Pt here via w/c with his wife and daughter. He was recently DC'd from hosp to a NH for UTI and confusion, he's still on Levoquin for one more day for his UTI. He has an indwelling catheter and urine is clear yellow at this time. States he's drinking plenty of fluids. He was d/c'd from hospital on 6mg TID of dexamethasone but no pepcid so is c/o heartburn, burping. Dr. Benton will put him on a PPI today. He has some mild confusion, family states it was way worse at time of hospitalization, then much better when he was DC'd and now a tiny bit worse again. Pt also has no voice and watery eyes at this time that he'd like to discuss with Dr. Benton. VSS. Denies pain, denies N/V. See flowsheet for full assmt.

## 2021-06-12 NOTE — PROGRESS NOTES
[facility] Radiation Oncology Follow Up Note    Patient: Ariel Louie  MRN: [id number]  Date of Service: 08/18/2017    Assessment / Impression     1. Brain metastasis     2. Radiation therapy induced brain necrosis     3. Small cell lung cancer, unspecified laterality        No matching staging information was found for the patient.  ECOG Peformance Status  ECOG Performance Status: 3  Distress Assessment Score  Distress Assessment Score: No distress  Body site: Brain    Plan:   Not convinced of progression.  signal abnormalities c/w treatment change./necrosis. Continue taper his steroids. Given poor functional status and three prior treatments to this lesion would not give further radiation, so will not get f/u scans unless clinically indicated.      Neurologically stable today, if progressive neuro sx, headache, etc needs head CT after fall on coumadin.     Long discussion today about why he is doing so poorly.  I explained that most of his most recent admission have been for UTI and fatigue.  I explained that as more steroids are used that that could make him more susceptible to infection, hence the decision to not get scans unless neuro change as these scans often lead to dexamethasone use.   I would llke the steroids to be used to treat symptoms not a scan.     The family wanted to know why he is getting so many UTI, and I explained that his catheter is a contributor.  They had more questions about this and I encouraged them to return to their urologist.         Face to face time  40 minutes with > 75% spent on consultation, education and coordination of care.    Subjective:      HPI: Ariel Louie is a 77 y.o. male with metastatic small cell lung cancer.    HISTORY: Ariel Louie was treated with SRS radiation therapy to the left frontal lobe for a brain met from extensive small cell lung cancer.     CNS XRT   SITE TREATED: PCI  TOTAL DOSE: 2500cGy  NUMBER OF FRACTIONS: 10  DATES COMPLETED:  8/2014      SITE TREATED: left frontal brain  TOTAL DOSE: 2200cGy  NUMBER OF FRACTIONS: 1  DATES COMPLETED: 5/2016      SITE TREATED: left frontal brain  TOTAL DOSE: 2700cGy  NUMBER OF FRACTIONS: 3  DATES COMPLETED: 12/2016    THORAX XRT  SITE TREATED: Thorax  TOTAL DOSE: 6600cGy  NUMBER OF FRACTIONS: 33  DATES COMPLETED: 10/2015      Post treatment he had vasogenic edema requiring steroids.  He has frequent admissions for fatigue and UTI.  Most recently he was discharged 8/10/2017 for UTI dehydration and fatigue.  He had a MRI which showed the left frontal lesion to be larger, but stable T2 signal.  On dexamethsone taper currently 2mg daily. Fell when He got up without assistance to bathroom.  Hit head on way to floor. No loss of consciousness, no progressive neurological sx.nausea vomiting,      Current Outpatient Prescriptions   Medication Sig Dispense Refill     polyethylene glycol (MIRALAX) 17 gram packet Take 17 g by mouth daily.       acetaminophen (TYLENOL) 500 MG tablet Take 1,000 mg by mouth every 6 (six) hours as needed for pain.        citalopram (CELEXA) 20 MG tablet Take 20 mg by mouth daily.        dexamethasone (DECADRON) 2 MG tablet Take 1 tablet (2 mg total) by mouth daily. 30 tablet 0     famotidine (PEPCID) 20 MG tablet Take 1 tablet (20 mg total) by mouth 2 (two) times a day. 60 tablet 3     lacosamide (VIMPAT) 50 mg Tab Take 1 tablet (50 mg total) by mouth at bedtime. 30 tablet 0     levETIRAcetam (KEPPRA) 750 MG tablet Take 1 tablet (750 mg total) by mouth 2 (two) times a day. (Patient taking differently: Take 750 mg by mouth 2 (two) times a day. ) 60 tablet 0     multivitamin therapeutic (THERAGRAN) tablet Take 1 tablet by mouth daily.        senna (SENOKOT) 8.6 mg tablet Take 2 tablets by mouth 2 (two) times a day.        warfarin (COUMADIN) 2.5 MG tablet Take 2.5 mg by mouth daily.       No current facility-administered medications for this visit.      Facility-Administered Medications  Ordered in Other Visits   Medication Dose Route Frequency Provider Last Rate Last Dose     heparin 100 unit/mL lockflush (PF) porcine 300-600 Units  300-600 Units Intravenous PRN Lanny Whelan MD   600 Units at 09/28/15 0911       The following portions of the patient's history were reviewed and updated as appropriate: allergies, current medications, past family history, past medical history, past social history, past surgical history and problem list.    Review of Systems    Constitutional  Constitutional (WDL): Exceptions to WDL  Fatigue: Fatigue not relieved by rest - Limiting instrumental ADL  Neurosensory  Neurosensory (WDL): Exceptions to WDL  Peripheral Motor Neuropathy: Severe symptoms, limiting self care ADL, assistive device indicated (using w/c)  Ataxia: Moderate symptoms, limiting instrumental ADL  Confusion: Mild disorientation (some days better than others)  Eye        Eye Disorder (WDL): Exceptions to WDL (wears glasses, bruised eye)  Ear     Cardiovascular  Cardiovascular (WDL): Exceptions to WDL  Edema: Yes (wearing glenna hose)  Pulmonary  Respiratory (WDL): Within Defined Limits  Gastrointestinal  Gastrointestinal (WDL): Exceptions to WDL  Genitourinary  Genitourinary (WDL): Exceptions to WDL (indwelling catheter)              Musculoskeletal              Musculoskeletal and Connetive Tissue Disorders (WDL): Exceptions to WDL  Muscle Weakness : Symptomatic, evident on physical exam, limiting instrumental ADL  Myalgia: Mild pain  Integumentary  Integumentary (WDL): Exceptions to WDL (bruises on left eye d/t fall on 8/17)  Patient Coping  Patient Coping: Accepting  Pain              Currently in Pain: No/denies  Accompanied by  Accompanied by: Family Member    Objective:     Physical Exam    Vitals:    08/18/17 1251   BP: 120/73   Pulse: 65   Temp: 98.1  F (36.7  C)   TempSrc: Oral   SpO2: 96%        Physical Exam:      General Appearance: Alert, cooperative, no distress, appears stated  age  Head: Normocephalic, has bruisig about left eye  Eyes: PERRL,  EOM's intact without nystagmus,   Ears: Wax bilaterally such that tympanic membranes could not be viewed  Throat: Oral mucous membranes moist without mucositis  Neck: Supple, symmetrical, trachea midline, no adenopathy  Lungs:  respirations unlabored  Extremities: Warm, dry and without edema  Skin: Skin color normal, no rashes  Neurologic: Speech halting, cognition intact, CNII-XII intact, grossly normal    strength, reflexes symmetric, MME intact, no dysmetria,    Recent Labs: No results found for this or any previous visit (from the past 168 hour(s)).    Imaging: Imaging results 30 days: Xr Chest Ap Portable    Result Date: 8/6/2017  XR CHEST AP PORTABLE 8/6/2017 4:43 PM INDICATION: CP COMPARISON: 3/26/2017. FINDINGS: The lungs are clear. The heart size and pulmonary vascularity are normal. Tunneled infusion port catheter is unchanged with tip in the superior vena cava near junction with right atrium. There is been no significant change from the prior study.    Mr Brain With Without Contrast    Result Date: 8/6/2017  HEAD MRI WITHOUT AND WITH IV CONTRAST 8/6/2017 9:38 PM INDICATION: Decrease alertness and history of metastatic lung cancer. TECHNIQUE: Head MRI without and with intravenous contrast. CONTRAST: 10 ml gadavist COMPARISON: CTA 7/5/2017 and an MRI the brain 6/13/2017. FINDINGS: On the diffusion-weighted images there is no evidence of restricted diffusion or acute ischemia. There is no evidence of a midline shift. There is no acute extra-axial fluid collection or intracranial hemorrhage noted. The ventricular system, basal cisterns and the cortical sulci are consistent with diffuse volume loss. There are appropriate flow voids seen within the distal vertebral arteries, basilar artery and the cavernous portions of the internal carotid arteries. Best visualized on the FLAIR and T2-weighted images are confluent areas of high signal within  the periventricular and subcortical white matter most prominent in the left frontal lobe. This most likely is a combination of diffuse small vessel ischemic disease and therapeutic changes. Again visualized is the irregular enhancing metastatic lesion involving the left frontal lobe which has hemosiderin staining within. This does have irregular enhancement. It currently measures approximately 2.4 cm anteriorly posteriorly x 2.4 cm  transversely as compared to previous measurements from 6/13/2017 of 1.8 cm x 2.0 cm. The high signal on the FLAIR and T2-weighted images surrounding this lesion appears fairly stable in the interval. There is no other abnormal focus of enhancement or mass lesion. There is no evidence of cerebellar tonsillar ectopia. Corpus callosum and the sella region have appropriate configuration and signal intensity for the patient's age. The orbit regions visualized on this study are unremarkable. There is fluid  noted within the mastoid air cells bilaterally. The sinuses are clear.     CONCLUSION: 1.  There is no evidence of a midline shift, acute hemorrhage or focus of acute ischemia. 2.  The irregular enhancing mass lesion consistent with a metastasis in the left frontal lobe has mildly increased in size in the interval. The surrounding high signal on the FLAIR and T2-weighted images appears stable in the interval. 3.  Ventricular system consistent with diffuse volume loss. 4.  Diffuse small vessel ischemic disease of the periventricular and subcortical white matter along with therapeutic changes involving the white matter especially the left frontal lobe. 5.  No new mass lesion or new focus of enhancement is visualized. NOTE: ABNORMAL REPORT THE DICTATION ABOVE DESCRIBES AN ABNORMALITY FOR WHICH FOLLOW-UP IS NEEDED. .    Ir Cv Catheter Check With Fluoroscopy    Result Date: 8/7/2017  PROCEDURE: Central venous catheter check. 8/7/2017 1:58 PM INDICATIONS: Malfunctioning venous access port. Unable  to aspirate. FLUOROSCOPIC TIME: 0.2 minutes. DOSE: (Air Kerma) 60 mGy. CONTRAST: 10 mL Omnipaque 300. FINDINGS: There is a right internal jugular port, with catheter tip in the high right atrium. Contrast injection demonstrates no extravasation. There is however a fibrin sheath around the tip of the catheter. CONCLUSIONS: 1.  Fibrin sheath around the end of the port catheter. We will arrange injection of low-dose TPA. We will not perform TPA infusion, with recent diagnosis of brain metastases. CPT codes, for physician reference only: 09047      Signed by: Aleksandra Benton MD

## 2021-06-12 NOTE — PROGRESS NOTES
Pt in wheelchair for radiation follow up, accompanied by wife and daughter. VSS, denies pain. Fell yesterday, d/t legs giving out, has bruising on left eye and face, red and swollen. Previously hospitalized for UTI, has catheter. Further recommendations and orders per provider.

## 2021-06-12 NOTE — PROGRESS NOTES
Pt in w/c to radiation clinic for follow up with palliative care, accompanied by wife and daughter. VSS, denies pain. Further recommendations and orders per palliative care team.

## 2021-06-25 NOTE — PROGRESS NOTES
Progress Notes by Verenice Cali CNS at 2017  3:00 PM     Author: Verenice Cali CNS Service: -- Author Type: Clinical Nurse Specialist    Filed: 2017  4:33 PM Encounter Date: 2017 Status: Signed    : Verenice Cali CNS (Clinical Nurse Specialist)       Palliative Care Progress Note      Consultation - Palliative Care  Ariel Louie,  1940, MRN [id number]      PCP: Luis Enrique Zuniga MD, [phone]   Code status:  FULL CODE       Extended Emergency Contact Information  Primary Emergency Contact: Betsy Louie  Address: [address] [place] [address] [place], [place] [id number] [place] [address]  Home Phone: [phone]  Mobile Phone: [phone]  Relation: Spouse  Secondary Emergency Contact: Grace Marsh   [facility]  Home Phone: [phone]  Mobile Phone: [phone]  Relation: Child          Impression and Recommendations:  1.  Generalized weakness and lethargy secondary to stage IV lung cancer with brain metastases, UTI  -Up as tolerated with assistance.  -PT and OT at [facility]     2. Bowel regimen - not on scheduled opiates.  - Sennakot 2 tabs po BID     3. Palliative Care Encounter - Please see discussion below.  - Hospice referral ordered.  -Patient and his family would like to meet with hospice prior to discharging [facility].  Plan is likely to discharge on Saturday, 17.  Family would like to discharge with [facility] hospice.  Would offer choices, family chose [facility].             HPI    Ariel Louie is a 77 y.o. year old male presents to the Outpatient Palliative Care Clinic today for ongoing symptom management and goals of care discussion. Nani Munoz LP-LICSW, oncology psychotherpist, and RADHA Schuster, were present as part of the interdisciplinary team.    Summary: Arile Louie is a 77 year old male with a past medical history of stage IV lung cancer with brain metastasis, bladder cancer, HTN, DVT on  "warfarin, UTI, GERD, paroxysmal atrial fibrillation, leukocytosis and chronic indwelling Mares catheter. He was initially diagnosed in January 2014 and treated with 6 cyclels of carboplatin and etoposide through May 2014, relapsed and then treated with 4 cycles of topotecan through Mat 2015, then 3 cycles of cisplatin and etoposide for relapse completed in October 2015.  MRI of the brain showed slight change in left frontal mass. Patient has had radiation 10/1/15 to left lung mass, 5/25/16 and 12/6/16 to Cyberknife to left frontal mass.   He was discharged from [facility] in 8/10/17 and sent to [facility] transitional care facility for strengthening.  Palliative care service saw patient during that hospitalization.  At that point in time patient wished to work on getting stronger so he could return home and potentially pursue more treatment.  In meeting with the patient today.  He tells us as well as his wife and daughter that he is too tired to continue on with aggressive therapy at [facility].  He has told his family that he is \"too tired\" to continue like this.  He wishes to return home and enjoy time with family.  Family support his wishes.  Hospice program and services were reviewed in detail and many questions answered.  Family would ultimately want him at home with hospice at discharge.  They would need medical equipment including and not limited to, hospital bed, bedside table, wheelchair, bedside commode, potentially shower chair and other items that hospice would provide.  Wife also has several questions regarding being able to continue her current [facility] along with hospice.  It would appear that the [facility] is [facility] is so she can get out in take care of herself and run errands for the home.  They are very interested in involving the hospice team.  They understand that with hospice patient would continue with oral antibiotics as needed not IV " antibiotics or IV fluids.  They understand that the ultimate goal is to minimize hospitalizations and to call hospice first with questions.     Previous Hospitalizations:  7/5/17 to 7/8/17 Fatigue - UTI and edema from Brain metastasis  3/26/17 to 3/28/17 altered mental status, UTI from chronic indwelling catheter, brain edema secondary to cyberknife  3/22/17 to 3/25/17Fatigue, UTI from chronic indwelling catheter  3/3/17 ED visit for urinary retention  2/16/17 ED visit for urinary retention    Advanced Care Plans/Health Care Directive: Patient's code status is currently full code. Patient does have a HCD on file.     Documented Healthcare Agent: Wife, Betsy Louie and daughter, Grace Marsh.      Spiritual History:  Are you a spiritual person? Yes  [employer] ayesha tradition  Do you have a Adventism or community providing you with spiritual support? Yes         Review of Systems:  Pain: 0/10  Dyspnea: none  Fatigue: moderate  Nausea: none  Anorexia: none  Drowsiness: moderate  Constipation: mild, intermittent  Agitation: none  Anxiety: moderate-severe  Depression: moderate-severe    Dementia: no   Delirium: no  Coma: o    Palliative Performance Score:     50%- 1. Mainly sit/lie; 2. Unable to do any work, extensive disease; 3. Considerable assistance required; 4. Normal or reduced; 5. Full or confusion  Physical Exam:  /77  Pulse 97  Temp 98.8  F (37.1  C) (Oral)   SpO2 94%  General appearance: alert, appears stated age, cooperative and fatigued  Head: Normocephalic, without obvious abnormality, atraumatic  Eyes: Sclera anicteric.  Pupils round and reactive.  EOMs intact.  Nose: no discharge  Throat: Oral mucosa moist.  No thrush noted.  Neck: supple, symmetrical, trachea midline  Lungs: Nonlabored respirations.  Heart: regular rate and rhythm  Abdomen: normal findings: soft, non-tender  Extremities: extremities normal, atraumatic, no cyanosis or edema  Neurologic: Grossly normal       Pertinent Labs  Lab  Results: personally reviewed.   Lab Results   Component Value Date     08/09/2017    K 3.8 08/09/2017     08/09/2017    CO2 27 08/09/2017    BUN 20 08/09/2017    CREATININE 0.83 08/09/2017    CALCIUM 8.6 08/09/2017     Lab Results   Component Value Date    WBC 4.8 08/09/2017    WBC 1.2 (LL) 09/25/2015    HGB 11.4 (L) 08/09/2017    HCT 35.5 (L) 08/09/2017    MCV 91 08/09/2017     (L) 08/09/2017     AST   Date Value Ref Range Status   08/06/2017 15 0 - 40 U/L Final     ALT   Date Value Ref Range Status   08/06/2017 29 0 - 45 U/L Final     Alkaline Phosphatase   Date Value Ref Range Status   08/06/2017 83 45 - 120 U/L Final     Albumin   Date Value Ref Range Status   08/06/2017 2.7 (L) 3.5 - 5.0 g/dL Final      Pertinent Radiology  Radiology Results: Personally reviewed impression/s   Xr Chest Ap Portable    Result Date: 8/6/2017  XR CHEST AP PORTABLE 8/6/2017 4:43 PM INDICATION: CP COMPARISON: 3/26/2017. FINDINGS: The lungs are clear. The heart size and pulmonary vascularity are normal. Tunneled infusion port catheter is unchanged with tip in the superior vena cava near junction with right atrium. There is been no significant change from the prior study.    Mr Brain With Without Contrast    Result Date: 8/6/2017  HEAD MRI WITHOUT AND WITH IV CONTRAST 8/6/2017 9:38 PM INDICATION: Decrease alertness and history of metastatic lung cancer. TECHNIQUE: Head MRI without and with intravenous contrast. CONTRAST: 10 ml gadavist COMPARISON: CTA 7/5/2017 and an MRI the brain 6/13/2017. FINDINGS: On the diffusion-weighted images there is no evidence of restricted diffusion or acute ischemia. There is no evidence of a midline shift. There is no acute extra-axial fluid collection or intracranial hemorrhage noted. The ventricular system, basal cisterns and the cortical sulci are consistent with diffuse volume loss. There are appropriate flow voids seen within the distal vertebral arteries, basilar artery and the  cavernous portions of the internal carotid arteries. Best visualized on the FLAIR and T2-weighted images are confluent areas of high signal within the periventricular and subcortical white matter most prominent in the left frontal lobe. This most likely is a combination of diffuse small vessel ischemic disease and therapeutic changes. Again visualized is the irregular enhancing metastatic lesion involving the left frontal lobe which has hemosiderin staining within. This does have irregular enhancement. It currently measures approximately 2.4 cm anteriorly posteriorly x 2.4 cm  transversely as compared to previous measurements from 6/13/2017 of 1.8 cm x 2.0 cm. The high signal on the FLAIR and T2-weighted images surrounding this lesion appears fairly stable in the interval. There is no other abnormal focus of enhancement or mass lesion. There is no evidence of cerebellar tonsillar ectopia. Corpus callosum and the sella region have appropriate configuration and signal intensity for the patient's age. The orbit regions visualized on this study are unremarkable. There is fluid  noted within the mastoid air cells bilaterally. The sinuses are clear.     CONCLUSION: 1.  There is no evidence of a midline shift, acute hemorrhage or focus of acute ischemia. 2.  The irregular enhancing mass lesion consistent with a metastasis in the left frontal lobe has mildly increased in size in the interval. The surrounding high signal on the FLAIR and T2-weighted images appears stable in the interval. 3.  Ventricular system consistent with diffuse volume loss. 4.  Diffuse small vessel ischemic disease of the periventricular and subcortical white matter along with therapeutic changes involving the white matter especially the left frontal lobe. 5.  No new mass lesion or new focus of enhancement is visualized. NOTE: ABNORMAL REPORT THE DICTATION ABOVE DESCRIBES AN ABNORMALITY FOR WHICH FOLLOW-UP IS NEEDED. .    Ir Cv Catheter Check With  Fluoroscopy    Result Date: 8/7/2017  PROCEDURE: Central venous catheter check. 8/7/2017 1:58 PM INDICATIONS: Malfunctioning venous access port. Unable to aspirate. FLUOROSCOPIC TIME: 0.2 minutes. DOSE: (Air Kerma) 60 mGy. CONTRAST: 10 mL Omnipaque 300. FINDINGS: There is a right internal jugular port, with catheter tip in the high right atrium. Contrast injection demonstrates no extravasation. There is however a fibrin sheath around the tip of the catheter. CONCLUSIONS: 1.  Fibrin sheath around the end of the port catheter. We will arrange injection of low-dose TPA. We will not perform TPA infusion, with recent diagnosis of brain metastases. CPT codes, for physician reference only: 95797    ECG 12 lead with MUSE   Order: [id number]   Status:  Final result   Visible to patient:  No (Not Released) Next appt:  Today at 10:00 AM in Physical Therapy (Luis E Rowland, PT Student)         Ref Range & Units 8/6/17  4:21 PM  7/5/17  8:57 PM     SYSTOLIC BLOOD PRESSURE mmHg        DIASTOLIC BLOOD PRESSURE mmHg        VENTRICULAR RATE BPM 76 60    ATRIAL RATE BPM 76 60    P-R INTERVAL ms 210 216    QRS DURATION ms 86 82    Q-T INTERVAL ms 406 436    QTC CALCULATION (BEZET) ms 456 436    P Axis degrees 41 7    R AXIS degrees 24 6    T AXIS degrees 56 39    MUSE DIAGNOSIS   Sinus rhythm with 1st degree A-V ... Sinus rhythm with 1st degree A-V ...   Sinus rhythm with 1st degree A-V block   Otherwise normal ECG   When compared with ECG of 05-JUL-2017 20:57,   No significant change was found   Confirmed by ISAAC JEAN BAPTISTE, SINGH LOC: ([id number]) on 8/7/2017 8:20:58 AM                 TREMAINE Cali, CNS  Clinical Nurse Specialist  [facility]  [phone]    E/M time/ total time: 80 minutes    >50% of time during encounter was spent with family and patient on counseling and care coordination as documented above. yes

## 2021-06-25 NOTE — PROGRESS NOTES
Progress Notes by Maryjo Bran MD at 3/14/2017  8:15 AM     Author: Maryjo Bran MD Service: -- Author Type: Physician    Filed: 4/4/2017  8:23 AM Encounter Date: 3/14/2017 Status: Signed    : Maryjo Bran MD (Physician)         [facility] Radiation Oncology Follow Up Note    Patient: Ariel Louie  MRN: [id number]  Date of Service: 03/14/2017    Assessment / Impression     1. Brain metastasis  MR Head With Without Contrast   2. Small cell lung cancer, unspecified laterality  MR Head With Without Contrast      ECOG Peformance Status  ECOG Performance Status: 1  Distress Assessment Score  Distress Assessment Score: No distress  Body site: Brain    Plan:     Small cell lung cancer status post brain metastasis: Ariel had prophylactic cranial radiation in August 2014 to a dose of 2500 cGy in 10 fractions.  He then developed metastasis in the left frontal lobe and was treated in May 2016 2 dose of 2200 cGy single fraction and then to second site in December 2016 to a dose of 2700 cGy in 3 fractions.  At this time his MRI is stable showing a decrease in the size of the lesion treated and no relative increase in the cerebral blood flow indicating treatment response.  Follow-up in 3 months time with repeat MRI.    Face to face time 20  minutes with > 50% spent on consultation, education and coordination of care.    Subjective:      HPI: Ariel Louie is a 77 y.o. male with with Extensive stage small cell lung cancer with mediastinal and hilar adenopathy and liver metastasis at initial presentation.      He did receive prophylactic cranial irradiation after an excellent response to chemotherapy. He received 2500 cGy in 10 fractions, delivered from August 6 to August 19 of 2014.       He then had a Relapse and received 4 cycles of Topotecan with good response. His post chemotherapy CT scan shows stable disease with minimal residual tumor in lung at the left hilum. A subsequent PET CT was done and  showed no new hypermetabolism but residual hypermetabolism in the left hilum.       He underwent consolidative radiation therapy to the residual disease receiving a dose of 6600 cGy to the left lung and hilum, delivered from August 17, 2015 through October 1, 2015.      He then developed a solitary metastasis in the left frontal lobe. He underwent stereotactic radiosurgery for this, on May 25, 2016, receiving a dose of 2200 cGy in the single fraction     He then developed recurrent metastasis left frontal lobe, which was treated to a dose of 2700 cGy 3 fractions, delivered from December 2 through December 6, 2016.                     Chief Complaint   Patient presents with   ? HE Cancer     Follow up with Dr. Bran   .    Current Outpatient Prescriptions   Medication Sig Dispense Refill   ? cholecalciferol, vitamin D3, 400 unit Tab Take 800 Units by mouth daily.     ? citalopram (CELEXA) 20 MG tablet Take 20 mg by mouth daily.     ? levETIRAcetam (KEPPRA) 750 MG tablet Take 1 tablet (750 mg total) by mouth 2 (two) times a day. 60 tablet 0   ? multivitamin therapeutic (THERAGRAN) tablet Take 1 tablet by mouth daily.     ? senna (SENOKOT) 8.6 mg tablet Take 3 tablets by mouth daily.      ? acetaminophen (TYLENOL) 500 MG tablet Take 1 tablet (500 mg total) by mouth every 6 (six) hours as needed for pain or fever. 30 tablet 0   ? lacosamide (VIMPAT) 50 mg Tab Take 1 tablet (50 mg total) by mouth at bedtime. For prevention of seizures 30 tablet 0   ? senna (SENOKOT) 8.6 mg tablet Take 2 tablets by mouth every evening.     ? warfarin (COUMADIN) 2.5 MG tablet Take 2.5-5 mg by mouth See Admin Instructions. Take 2.5mg daily Monday through Friday. Take 5mg on Saturday and Sunday        No current facility-administered medications for this visit.      Facility-Administered Medications Ordered in Other Visits   Medication Dose Route Frequency Provider Last Rate Last Dose   ? heparin 100 unit/mL lockflush (PF) porcine 300-600  "Units  300-600 Units Intravenous PRN Lanny Whelan MD   600 Units at 09/28/15 0911       The following portions of the patient's history were reviewed and updated as appropriate: allergies, current medications, past family history, past medical history, past social history, past surgical history and problem list.    Review of Systems    Constitutional  Constitutional (WDL): Exceptions to WDL  Fatigue: Fatigue relieved by rest  Neurosensory  Neurosensory (WDL): Exceptions to WDL  Ataxia: Asymptomatic, clinical or diagnostic observations only, intervention not indicated (uses a wheeled walker)  Eye        Eye Disorder (WDL): Exceptions to WDL (glasses)  Blurred Vision: Intervention not indicated (occasional)  Ear     Cardiovascular  Cardiovascular (WDL): All cardiovascular elements are within defined limits  Pulmonary  Respiratory (WDL): Within Defined Limits  Gastrointestinal  Gastrointestinal (WDL): Exceptions to WDL  Genitourinary  Genitourinary (WDL): Exceptions to WDL (catheter in place)              Musculoskeletal              Musculoskeletal and Connetive Tissue Disorders (WDL): All Musculoskeletal and Connetive Tissue Disorder elements are within defined limits  Integumentary  Integumentary (WDL): All integumentary elements are within defined limits  Patient Coping  Patient Coping: Accepting  Pain              Currently in Pain: No/denies  Accompanied by  Accompanied by: Family Member    Objective:     Physical Exam    Vitals:    03/14/17 0818   BP: 140/76   Pulse: 70   Temp: 97.8  F (36.6  C)   TempSrc: Oral   SpO2: 99%   Weight: 214 lb 3.2 oz (97.2 kg)   Height: 5' 11\" (1.803 m)        General appearance: alert, appears stated age and cooperative  Head: Normocephalic, without obvious abnormality, atraumatic  Eyes: conjunctivae/corneas clear. PERRL, EOM's intact. Fundi benign.  Ears: normal TM's and external ear canals both ears  Throat: lips, mucosa, and tongue normal; teeth and gums " normal  Lungs: clear to auscultation bilaterally  Heart: regular rate and rhythm, S1, S2 normal, no murmur, click, rub or gallop  Skin: Skin color, texture, turgor normal. No rashes or lesions  Lymph nodes: Cervical, supraclavicular, and axillary nodes normal.  Neurologic: Alert and oriented X 3, normal strength and tone. Normal symmetric reflexes. Normal coordination and gait    Recent Labs: No results found for this or any previous visit (from the past 168 hour(s)).    Imaging: Imaging results 30 days: Xr Chest Pa And Lateral    Result Date: 3/26/2017  XR CHEST PA AND LATERAL 3/26/2017 9:07 PM INDICATION: altered mental status, lung cancer. COMPARISON: 3/22/2017, chest CT 1/4/2017 FINDINGS: Right IJ Port-A-Cath in place. Slight fibrosis about the left hilum is unchanged. No no parenchymal disease. Heart and pulmonary vascularity are normal. No effusions.    Xr Chest Pa And Lateral    Result Date: 3/22/2017  XR CHEST PA AND LATERAL 3/22/2017 2:59 PM INDICATION: Fatigue. COMPARISON: CT chest 7 pelvis 1/4/2017 and chest x-ray 12/10/2016. FINDINGS: Right IJ Port-A-Cath tip at cavoatrial junction. Heart size and vascularity are normal. Post radiation change left perihilar region redemonstrated. No acute infiltrate, pneumothorax or pleural effusion.    Ct Head Without Contrast    Result Date: 3/26/2017  [facility] CT HEAD WO CONTRAST 3/26/2017 9:03 PM INDICATION: Altered mental status, history of brain mets. TECHNIQUE: Routine. Dose reduction techniques were used. CONTRAST: None. COMPARISON:  3/13/2017. FINDINGS: No intracranial hemorrhage, extraaxial collection, mass effect or CT evidence of acute infarct.  Mild cerebral and cerebellar volume loss. Mild burden presumed chronic small vessel ischemia grossly unchanged compared to the previous MRI. The left frontal metastatic lesion is much better appreciated on previous MRI. Surrounding low attenuation changes are similar. No new mass effect. No definite CT  evidence of acute infarct. Osseous structures are intact. The visualized orbits, paranasal sinuses and mastoid air cells are free of significant disease.     CONCLUSION: 1.  No acute intracranial hemorrhage, midline shift, or mass effect. No definite CT evidence of acute infarct. 2.  Mild age-related changes. 3.  Treated left frontal lobe metastatic lesion much better appreciated on previous MRI. The adjacent low-attenuation changes are stable. No new mass effect.     Mr Head With Without Contrast    Result Date: 3/14/2017  HEAD MRI WITHOUT AND WITH IV CONTRAST 3/13/2017 11:30 AM INDICATION: Left frontal metastasis in the setting of primary lung carcinoma, with edema after 12/06/2016 radiosurgery TECHNIQUE: Head MRI without and with intravenous contrast. CONTRAST: 10 mL Gadavist. COMPARISON: Prior MRI examinations of the brain, last performed 02/06/2017. FINDINGS: No restricted diffusion suggestive of acute infarct. Heterogeneously enhancing, treated juxtacortical metastatic lesion in the left superior frontal gyrus currently measures 18.5 mm craniocaudal x 13 mm AP x 15.5 mm transverse, continuing to demonstrate reduction in conspicuity as compared to 2/6/2017 and 11/28/2016 examination (measuring 18.5 x 14 x 17.5 mm on 02/06/2017 and 18.5 x 18 x 17.5 mm on 11/28/2016). Redemonstrated intermediate to bright T1 signal intensity and marginal, nodular blooming susceptibility artifact associated with the anterolateral margin of the lesion, compatible with aging blood products. Continued reduction in the conspicuity of nonenhancing perilesional T2/FLAIR prolongation. Mild generalized cerebral atrophy. Persistent  confluent T2/FLAIR signal abnormality in the periventricular white matter, likely on the basis of combined treatment related and small vessel ischemic changes. Stable small foci of blooming susceptibility artifact within the deep white matter of the bilateral cerebral hemispheres.  No new enhancing lesion is  identified. The pituitary gland is unremarkable. The major intracranial vascular flow voids are maintained. The orbits are unremarkable. The calvarium and skull base are unremarkable. The paranasal sinuses are clear. Bilateral mastoid tip fluid.  Perfusion imaging: Semiquantitative blood flow measures of the cerebral hemispheres and posterior fossa demonstrate anticipated perfusion alteration in the region of perilesional vasogenic edema, with relative reduced blood volume. No convincing focal increase in blood volume is evident.      CONCLUSION: 1. Continued reduced conspicuity of enhancing, treated juxtacortical metastatic lesion in the left superior frontal gyrus, currently measuring 18.5 x 13 x 15.5 mm (18.5 x 14 x 17.5 mm on 02/06/2017 and 18.5 x 18 x 17.5 mm on 11/28/2016 when measured in similar planes). Lack of focal increase in relative cerebral blood volume is compatible with treatment related changes/lack of disease progression. 2. No new intracranial lesion. 3. Continued reduction in left frontal perilesional vasogenic edema, with a stable background of treatment-related and small vessel ischemic changes.       Signed by: Maryjo Bran MD

## 2021-06-25 NOTE — PROGRESS NOTES
Progress Notes by Maryjo Bran MD at 6/14/2017  9:45 AM     Author: Maryjo Bran MD Service: -- Author Type: Physician    Filed: 6/15/2017  4:23 PM Encounter Date: 6/14/2017 Status: Signed    : Maryjo Bran MD (Physician)         [facility] Radiation Oncology Follow Up Note    Patient: Ariel Louie  MRN: [id number]  Date of Service: 06/14/2017    Assessment / Impression     1. Brain metastasis  MR Brain With Without Contrast      ECOG Peformance Status  ECOG Performance Status: 3  Distress Assessment Score  Distress Assessment Score: No distress (States he is not stressed but family reports appts cause anx)  Interventions  Distress Assessment Intervention: Provider Notified  Body site: Brain    Plan:      Brain metastasis secondary to small cell lung cancer: His MRI shows an increase in size of couple of lesions but no increase in the relative to cerebral blood volume.  I think this is because of radiation changes which spread out to occur in the surrounding brain at this dose of radiation.  Follow-up in 3 months time with repeat MRI.    Face to face time  20 minutes with > 50% spent on consultation, education and coordination of care.    Subjective:      HPI: Ariel Louie is a 77 y.o. male with  with Extensive stage small cell lung cancer with mediastinal and hilar adenopathy and liver metastasis at initial presentation.      He did receive prophylactic cranial irradiation after an excellent response to chemotherapy. He received 2500 cGy in 10 fractions, delivered from August 6 to August 19 of 2014.       He then had a Relapse and received 4 cycles of Topotecan with good response. His post chemotherapy CT scan shows stable disease with minimal residual tumor in lung at the left hilum. A subsequent PET CT was done and showed no new hypermetabolism but residual hypermetabolism in the left hilum.       He underwent consolidative radiation therapy to the residual disease receiving a  dose of 6600 cGy to the left lung and hilum, delivered from August 17, 2015 through October 1, 2015.      He then developed a solitary metastasis in the left frontal lobe. He underwent stereotactic radiosurgery for this, on May 25, 2016, receiving a dose of 2200 cGy in the single fraction      He then developed recurrent metastasis left frontal lobe, which was treated to a dose of 2700 cGy 3 fractions, delivered from December 2 through December 6, 2016.                            Chief Complaint   Patient presents with   ? HE Cancer     f/u appt   .    Current Outpatient Prescriptions   Medication Sig Dispense Refill   ? acetaminophen (TYLENOL) 325 MG tablet Take 325 mg by mouth every 4 (four) hours as needed for pain or fever. Indications: Pain     ? cholecalciferol, vitamin D3, 400 unit Tab Take 800 Units by mouth daily.     ? citalopram (CELEXA) 20 MG tablet Take 20 mg by mouth daily.     ? lacosamide (VIMPAT) 50 mg Tab Take 50 mg by mouth at bedtime.     ? levETIRAcetam (KEPPRA) 750 MG tablet Take 1 tablet (750 mg total) by mouth 2 (two) times a day. 60 tablet 0   ? multivitamin therapeutic (THERAGRAN) tablet Take 1 tablet by mouth daily.     ? senna (SENOKOT) 8.6 mg tablet Take 3 tablets by mouth daily.      ? warfarin (COUMADIN) 2.5 MG tablet Take 2.5-5 mg by mouth See Admin Instructions. Take 2.5mg daily Monday through Friday. Take 5mg on Saturday and Sunday        No current facility-administered medications for this visit.      Facility-Administered Medications Ordered in Other Visits   Medication Dose Route Frequency Provider Last Rate Last Dose   ? heparin 100 unit/mL lockflush (PF) porcine 300-600 Units  300-600 Units Intravenous PRN Lanny Whelan MD   600 Units at 09/28/15 0911       The following portions of the patient's history were reviewed and updated as appropriate: allergies, current medications, past family history, past medical history, past social history, past surgical history  "and problem list.    Review of Systems    Constitutional  Constitutional (WDL): Exceptions to WDL  Fatigue: Fatigue not relieved by rest - Limiting instrumental ADL  Weight Loss: to <10% from baseline, intervention not indicated (220 lbs on 5/11)  Neurosensory  Neurosensory (WDL): Exceptions to WDL (Family reports difficulty with speaking-worse with fatigue)  Peripheral Motor Neuropathy: Severe symptoms, limiting self care ADL, assistive device indicated  Ataxia: Moderate symptoms, limiting instrumental ADL (walker in the home)  Peripheral Sensory Neuropathy: None  Confusion: None  Dizziness: None  Eye        Eye Disorder (WDL): All eye disorder elements are within defined limits  Ear     Cardiovascular  Cardiovascular (WDL): All cardiovascular elements are within defined limits  Pulmonary  Respiratory (WDL): Within Defined Limits  Gastrointestinal  Gastrointestinal (WDL): Exceptions to WDL  Anorexia: Loss of appetite without alteration in eating habits  Nausea: None  Vomiting: None  Genitourinary  Genitourinary (WDL): Exceptions to WDL (urinary catheter)              Musculoskeletal              Musculoskeletal and Connetive Tissue Disorders (WDL): All Musculoskeletal and Connetive Tissue Disorder elements are within defined limits  Integumentary  Integumentary (WDL): All integumentary elements are within defined limits  Patient Coping  Patient Coping: Accepting  Pain              Currently in Pain: No/denies  Accompanied by  Accompanied by: Family Member    Objective:     Physical Exam    Vitals:    06/14/17 1010   BP: 128/69   Pulse: 73   SpO2: 95%   Weight: 217 lb (98.4 kg)   Height: 5' 10\" (1.778 m)        General appearance: alert, appears stated age and cooperative  Head: Normocephalic, without obvious abnormality, atraumatic  Eyes: negative findings: lids and lashes normal, conjunctivae and sclerae normal and corneas clear  Throat: lips, mucosa, and tongue normal; teeth and gums normal  Neck: no adenopathy, " no carotid bruit, no JVD, supple, symmetrical, trachea midline and thyroid not enlarged, symmetric, no tenderness/mass/nodules  Lungs: clear to auscultation bilaterally  Heart: regular rate and rhythm, S1, S2 normal, no murmur, click, rub or gallop  Skin: Skin color, texture, turgor normal. No rashes or lesions  Lymph nodes: Cervical, supraclavicular, and axillary nodes normal.  Neurologic: Alert and oriented.  Minimal responsiveness to questions.  Family states that he is not very verbal    Recent Labs:   Recent Results (from the past 168 hour(s))   INR   Result Value Ref Range    INR 2.17 (H) 0.90 - 1.10       Imaging: Imaging results 30 days: Mr Head With Without Contrast    Result Date: 6/14/2017  [facility] HEAD MRI WITHOUT AND WITH IV CONTRAST 6/13/2017 1:00 PM INDICATION: Follow-up brain metastasis from small cell lung cancer TECHNIQUE: Head MRI without and with intravenous contrast. CONTRAST: 10 cc gadavist COMPARISON:  03/26/2017 FINDINGS: No restricted diffusion suggestive of acute infarct.  Unchanged moderate diffuse parenchymal volume loss with ex vacuo ventricular dilatation. Unchanged periventricular FLAIR signal abnormality which may relate to sequela of chronic microvascular ischemic change as well as posttreatment changes.     The cerebellum is unremarkable. When compared to 03/13/2017, there has been interval increase in size of the left frontal lobe perilesional FLAIR signal hyperintensity. The area of enhancement has also increased, currently measuring 1.8 x 2.1 cm (image 20, axial postcontrast series 13), previously measuring 1.5 x 1.6 cm. There is mild increased relative cerebral blood volume along the periphery of the lesion. The pituitary gland is unremarkable. The major intracranial vascular flow voids are maintained. The orbits are unremarkable. The calvarium and skull base are unremarkable.  The paranasal sinuses are clear. Partial fluid opacification of the mastoid air cells.         CONCLUSION: 1.  When compared to the 03/13/2017 study, there is been interval increase in size of the left frontal lobe enhancing lesion, currently measuring 1.8 x 2.1 cm, previously 1.5 x 1.6 cm. The surrounding perilesional FLAIR signal hyperintensity has also increased. There is relative cerebral blood volume signal along the margin of the enhancing lesion which is not above background parenchyma, however, is new/increased compared to the prior MRI study. Attention should be paid to this area on followup exam.     Us Venous Leg Right    Result Date: 6/13/2017  US VENOUS LEG RIGHT 6/13/2017 1:37 PM INDICATION: leg pain, hx of lung cancer TECHNIQUE: Routine exam without and with compression, augmentation, and duplex utilizing 2D gray-scale imaging, Doppler interrogation with color-flow and spectral waveform analysis. COMPARISON: 1/17/2017 FINDINGS: The common femoral, femoral, popliteal, and segmentally visualized calf veins were evaluated. The opposite CFV was also included in the evaluation. Right leg veins are negative for acute deep venous thrombosis. There is chronic occlusive thrombus in the right and left proximal and mid superficial femoral veins extending to the distal common femoral veins. There is a small chronic-appearing thrombus in the distal right popliteal vein. No popliteal cysts.     CONCLUSION: 1.  Right leg veins are positive for chronic appearing DVT as detailed above. No acute DVT identified. 2.  Findings will be relayed to the ordering collection.      Signed by: Maryjo Bran MD

## 2021-08-03 PROBLEM — N39.0 UTI (URINARY TRACT INFECTION) DUE TO URINARY INDWELLING CATHETER (H): Status: RESOLVED | Noted: 2017-01-01 | Resolved: 2017-01-01

## 2021-08-03 PROBLEM — T83.511A UTI (URINARY TRACT INFECTION) DUE TO URINARY INDWELLING CATHETER (H): Status: RESOLVED | Noted: 2017-01-01 | Resolved: 2017-01-01

## 2021-08-03 PROBLEM — C34.80 MALIGNANT NEOPLASM OF OVERLAPPING SITES OF LUNG (H): Status: RESOLVED | Noted: 2017-01-01 | Resolved: 2017-01-01

## 2023-03-31 NOTE — PROGRESS NOTES
[facility] Hematology and Oncology Progress Note    Patient: Ariel Louie  MRN: [id number]  Date of Service: 01/05/2017        Reason for Visit    Chief Complaint   Patient presents with     HE Cancer     Small cell lung cancer, left - follow up       Assessment and Plan    Small cell lung cancer, now in complete remission  Right lower extremity DVT, diagnosed October 2015  Second occurrence of brain metastasis status post CyberKnife radiation  Urinary retention requiring indwelling catheter    CT scans reviewed and there is no evidence of recurrence of his small cell lung cancer.  I reassured the patient and congratulated him on this.  We will continue observation and see him back again in 3 months with repeat CT scans.    He had repeat ultrasounds which show persistence of DVT in the right lower extremity.  This could be chronic changes from his original DVT but for now he is on anticoagulation with warfarin.  I will have them alternate 2.5 mg daily with 5 mg daily.  INR will be rechecked in 1 week.    He completed his second course of CyberKnife radiation to 2700 cGy in 3 fractions between December 2 through December 6 and is recovering from this.    He has required permanent indwelling catheter because of urinary obstruction.  He is following with urology for this.    Plan: Continue warfarin as above  Follow-up in 3 months with repeat lab work and imaging    Measurable disease: MRI of the brain, CT of the chest abdomen and pelvis, patient in complete remission          Current therapy: Coumadin          Treatment history:  Second course of CyberKnife radiation to 2700 cGy in 3 fractions between December 2 and December 6  CyberKnife radiation 2200 cGy in 1 treatment to new left frontal solitary metastasis Joselin 15, 2016  Patient completed concurrent radiation with 3 cycles of cisplatin etoposide in the first week of October 2015, for second relapse  4 cycles of Topotecan for first relapse comleted 5/15 with near  Read in MyChart   3/29/2023  2:03 PM by Floresita Blank (proxy for Simon Blank)    Closing encounter      complete response   PCI 8/14   Carboplatin and etoposide for 6 cycles 1-5/2014 after initial diagnosis         ECOG Performance   ECOG Performance Status: 1    Distress Assessment  Distress Assessment Score: No distress    Pain           Problem List    1. Small cell lung cancer, unspecified laterality  CT Chest Abdomen Pelvis Without Oral With IV Contrast    HM1(CBC and Differential)    Comprehensive Metabolic Panel   2. Brain metastasis     3. DVT (deep venous thrombosis)  INR    warfarin (COUMADIN) 5 MG tablet        CC: Luis Enrique Zuniga MD    ______________________________________________________________________________    History of Present Illness    Mr. Ariel Louie is here for follow-up.  He was seen 3 months ago.  He has had 2 hospitalizations since Thanksgiving.  He was found to have recurrence of brain metastasis.  He was admitted with seizure and then received a CyberKnife radiation therapy.  At that time he developed some urinary retention and hematuria.  Coumadin was initially discontinued.  It was restarted because a repeat ultrasound suggested DVT.  He was rehospitalized with complications from urinary tract infection.    He is recovering slowly from his hospitalizations and radiation therapy.  He is undergoing physical therapy and is getting stronger.  ECOG status is 2.  No fever or mouth sores currently.  No headache or dizziness.  No shortness of breath or cough.  No change in bowels.  He has occasional blood in the catheter.  No rash.    Pain Status  Currently in Pain: No/denies    Review of Systems    Constitutional  Constitutional (WDL): Exceptions to WDL  Fatigue: Fatigue relieved by rest  Weight Loss: to <10% from baseline, intervention not indicated (Down 5 lbs since last visit. )  Neurosensory  Neurosensory (WDL): Exceptions to WDL  Ataxia: Asymptomatic, clinical or diagnostic observations only, intervention not indicated (Off-balance. )  Cardiovascular  Cardiovascular (WDL): All  cardiovascular elements are within defined limits  Pulmonary  Respiratory (WDL): Within Defined Limits  Gastrointestinal  Gastrointestinal (WDL): All gastrointestinal elements are within defined limits  Genitourinary  Genitourinary (WDL): Exceptions to WDL (Indwelling catheter since last admission. )  Integumentary  Integumentary (WDL): All integumentary elements are within defined limits  Patient Coping  Patient Coping: Accepting  Distress Assessment  Distress Assessment Score: No distress  Accompanied by  Accompanied by: Family Member    Past History  Past Medical History   Diagnosis Date     Anxiety      Back pain      Brain metastasis      DVT (deep venous thrombosis), right      Forgetfulness      GERD (gastroesophageal reflux disease)      History of cancer chemotherapy      Leukocytosis 12/10/2016     Lung cancer      UTI (urinary tract infection) 12/10/2016         Past Surgical History   Procedure Laterality Date     Knee surgery       Bladder surgery       Portacath placement       Back surgery       x 4       Physical Exam    Recent Vitals 1/5/2017   Weight 212 lbs 11 oz   /69   Pulse -   Temp -   Temp src 1   SpO2 -       GENERAL: Alert and oriented. Seated comfortably. In no distress.    HEAD: Atraumatic and normocephalic.  Has a full head of hair.    EYES: LUBA, EOMI.  No pallor.  No icterus.    Oral cavity: no mucosal lesion or tonsillar enlargement.    NECK: supple. JVP normal.  No thyroid enlargement.    LYMPH NODES: No palpable, cervical, axillary or inguinal lymphadenopathy.    CHEST: clear to auscultation bilaterally.  Resonant to percussion throughout bilaterally.  Symmetrical breath movements bilaterally.    CVS: S1 and S2 are heard. Regular rate and rhythm.  No murmur or gallop or rub heard.    ABDOMEN: Soft. Not tender. Not distended.  No palpable hepatomegaly or splenomegaly.  No other mass palpable.  Bowel sounds heard.    EXTREMITIES: Warm.  No peripheral edema.    SKIN: no rash, or  bruising or purpura.    CNS: Nonfocal        Lab Results    Recent Results (from the past 168 hour(s))   INR   Result Value Ref Range    INR 1.90 (!) 0.9 - 1.1   INR   Result Value Ref Range    INR 3.30 (!) 0.9 - 1.1   Levetiracetam [Keppra ]   Result Value Ref Range    Levetiracetam 33.7 6.0 - 46.0 ug/mL   Comprehensive Metabolic Panel   Result Value Ref Range    Sodium 141 136 - 145 mmol/L    Potassium 4.0 3.5 - 5.0 mmol/L    Chloride 106 98 - 107 mmol/L    CO2 29 22 - 31 mmol/L    Anion Gap, Calculation 6 5 - 18 mmol/L    Glucose 95 70 - 125 mg/dL    BUN 19 8 - 28 mg/dL    Creatinine 0.99 0.70 - 1.30 mg/dL    GFR MDRD Af Amer >60 >60 mL/min/1.73m2    GFR MDRD Non Af Amer >60 >60 mL/min/1.73m2    Bilirubin, Total 0.3 0.0 - 1.0 mg/dL    Calcium 9.0 8.5 - 10.5 mg/dL    Protein, Total 5.7 (L) 6.0 - 8.0 g/dL    Albumin 2.5 (L) 3.5 - 5.0 g/dL    Alkaline Phosphatase 73 45 - 120 U/L    AST 12 0 - 40 U/L    ALT 17 0 - 45 U/L   HM1 (CBC with Diff)   Result Value Ref Range    WBC 5.3 4.0 - 11.0 thou/uL    RBC 3.77 (L) 4.40 - 6.20 mill/uL    Hemoglobin 11.4 (L) 14.0 - 18.0 g/dL    Hematocrit 33.8 (L) 40.0 - 54.0 %    MCV 90 80 - 100 fL    MCH 30.1 27.0 - 34.0 pg    MCHC 33.6 32.0 - 36.0 g/dL    RDW 14.7 (H) 11.0 - 14.5 %    Platelets 164 140 - 440 thou/uL    MPV 7.2 7.0 - 10.0 fL    Neutrophils % 77 (H) 50 - 70 %    Lymphocytes % 14 (L) 20 - 40 %    Monocytes % 7 2 - 10 %    Eosinophils % 1 0 - 6 %    Basophils % 1 0 - 2 %    Neutrophils Absolute 4.1 2.0 - 7.7 thou/uL    Lymphocytes Absolute 0.8 0.8 - 4.4 thou/uL    Monocytes Absolute 0.4 0.0 - 0.9 thou/uL    Eosinophils Absolute 0.1 0.0 - 0.4 thou/uL    Basophils Absolute 0.0 0.0 - 0.2 thou/uL   INR   Result Value Ref Range    INR 2.51 (H) 0.90 - 1.10   Protime-INR   Result Value Ref Range    INR 2.20 (H) 0.90 - 1.10       Imaging    Xr Chest Ap Portable    Result Date: 12/10/2016  XR CHEST AP PORTABLE 12/10/2016 8:46 PM INDICATION: Frontal lobe enhancing mass COMPARISON:  None. FINDINGS: Right internal jugular venous access port catheter tip overlies right atrium. No acute infiltrates. Radiation change left hilum. Heart size normal    Us Venous Legs Bilateral    Result Date: 12/11/2016  US VENOUS LEGS BILATERAL 12/11/2016 2:12 PM INDICATION: prior DVT on Coumadin, exam to eval for clot persistence TECHNIQUE: Routine exam with compression, augmentation, and duplex utilizing 2D gray-scale imaging, Doppler interrogation with color-flow and spectral waveform analysis. COMPARISON: 11/19/2015 FINDINGS: The common femoral, femoral, popliteal, and segmentally visualized calf veins were evaluated. Right leg veins are positive for deep venous thrombosis. Specifically, there is an occlusive thrombus in the right profunda femoral vein and mid right superficial femoral vein. There is nonocclusive thrombus in the right popliteal vein. Left leg veins are negative for deep venous thrombosis. There is a popliteal cyst on the right measuring 4.6 x 1.4 x 2.8 cm. There is a popliteal cyst on the left measuring 4.5 x 0.8 x 4.4 cm..     CONCLUSION: 1.  Right leg veins are positive for DVT as detailed above. No definite change from the prior study. 2.  Left leg veins are negative for DVT. 3.  Bilateral popliteal cysts.    Ct Chest Abdomen Pelvis Without Oral With Iv Contrast    Result Date: 1/4/2017  CT CHEST, ABDOMEN, AND PELVIS 1/4/2017 9:22 AM      INDICATION: fu lung cancer TECHNIQUE: CT chest, abdomen, and pelvis. Dose reduction techniques were used. IV CONTRAST: Iohexol (Omni) 75mL COMPARISON: 9/28/2016 FINDINGS: CHEST: Stable fibrotic change left perihilar chest. Nothing for local tumor recurrence. No new lung nodules. No mediastinal lymphadenopathy.  ABDOMEN: No significant findings in the liver, spleen, pancreas, kidneys, and adrenal glands. No retroperitoneal lymphadenopathy. PELVIS: Mares catheter in the bladder with generalized thickening to the bladder as seen previously. No pelvic  lymphadenopathy. MUSCULOSKELETAL: Negative.     CONCLUSION: 1.  Post radiation change left perihilar chest is stable. 2.  Nothing for tumor recurrence in the chest, abdomen, or pelvis. 3.  No significant change since 9/28/2016.        Signed by: Lanny Whelan MD